# Patient Record
(demographics unavailable — no encounter records)

---

## 2025-01-06 NOTE — COUNSELING
[Fall prevention counseling provided] : Fall prevention counseling provided [Behavioral health counseling provided] : Behavioral health counseling provided [Plan in advance] : Plan in advance [Encouraged to maintain food diary] : Encouraged to maintain food diary [Transportation Issues] : Transportation issues

## 2025-01-08 NOTE — ASSESSMENT
[FreeTextEntry1] : #HCM -will address at next visit in 5 weeks for a comprehensive visit  Marcus Reynaga  [facility]

## 2025-01-08 NOTE — HISTORY OF PRESENT ILLNESS
[Family Member] : family member [FreeTextEntry8] :  50 year-old female with history of asthma, allergic rhinitis, cholecystectomy, and chronic pelvis/abdominal with extensive work-up and s/p ex-lap (11/2019), KVEIN (1/2020), pevlic floor dysfunction, self reported stroke, seizure but not on any AED  and unspecified autoimmune disorders? presents for forms - CDPAP, and to reestablish care.   Pelvic floor dysfunction with associated symptoms of urinary incontinence and suprapubic pain is stable, followed by CRS who did multiple imaging studies with small rectocele,cystocele, peritonicele, and she uses diazepam suppository for pain control.   Skin rash -has been evaluated by the ED at [facility] with a documented dx of hyperhydrosis that required an endo referral but not able to due to lack of insurance coverage? skin lesion was also biopsied by derm who recommended rheum referral due to positive CHANDA? she is also endorsing night sweats, hot flashes that might be indicatint that she is perimenopausal

## 2025-01-08 NOTE — HEALTH RISK ASSESSMENT
[Independent] : using telephone [Some assistance needed] : doing laundry [Full assistance needed] : managing finances [Intercurrent ED visits] : went to ED [No] : No [Never (0 pts)] : Never (0 points) [0] : 2) Feeling down, depressed, or hopeless: Not at all (0) [Never] : Never [No falls in past year] : Patient reported no falls in the past year [de-identified] : dermtology CRS

## 2025-01-08 NOTE — PHYSICAL EXAM
[No Acute Distress] : no acute distress [Well Nourished] : well nourished [Well Developed] : well developed [Well-Appearing] : well-appearing [Normal Voice/Communication] : normal voice/communication [Normal Sclera/Conjunctiva] : normal sclera/conjunctiva [PERRL] : pupils equal round and reactive to light [EOMI] : extraocular movements intact [No Respiratory Distress] : no respiratory distress  [No Accessory Muscle Use] : no accessory muscle use [Clear to Auscultation] : lungs were clear to auscultation bilaterally [Soft] : abdomen soft [Non-distended] : non-distended [No Masses] : no abdominal mass palpated [No HSM] : no HSM [Normal Bowel Sounds] : normal bowel sounds [No Joint Swelling] : no joint swelling [Grossly Normal Strength/Tone] : grossly normal strength/tone [Coordination Grossly Intact] : coordination grossly intact [No Focal Deficits] : no focal deficits [Normal Gait] : normal gait [Speech Grossly Normal] : speech grossly normal [de-identified] : TTP in the suprapubic area [de-identified] : multiple scattered lesions in the trunk macular healing wound also noted.

## 2025-01-08 NOTE — PHYSICAL EXAM
[No Acute Distress] : no acute distress [Well Nourished] : well nourished [Well Developed] : well developed [Well-Appearing] : well-appearing [Normal Voice/Communication] : normal voice/communication [Normal Sclera/Conjunctiva] : normal sclera/conjunctiva [PERRL] : pupils equal round and reactive to light [EOMI] : extraocular movements intact [No Respiratory Distress] : no respiratory distress  [No Accessory Muscle Use] : no accessory muscle use [Clear to Auscultation] : lungs were clear to auscultation bilaterally [Soft] : abdomen soft [Non-distended] : non-distended [No Masses] : no abdominal mass palpated [No HSM] : no HSM [Normal Bowel Sounds] : normal bowel sounds [No Joint Swelling] : no joint swelling [Grossly Normal Strength/Tone] : grossly normal strength/tone [Coordination Grossly Intact] : coordination grossly intact [No Focal Deficits] : no focal deficits [Normal Gait] : normal gait [Speech Grossly Normal] : speech grossly normal [de-identified] : multiple scattered lesions in the trunk macular healing wound also noted.  [de-identified] : TTP in the suprapubic area

## 2025-01-08 NOTE — HEALTH RISK ASSESSMENT
[Independent] : using telephone [Some assistance needed] : doing laundry [Full assistance needed] : managing finances [Intercurrent ED visits] : went to ED [No] : No [Never (0 pts)] : Never (0 points) [0] : 2) Feeling down, depressed, or hopeless: Not at all (0) [Never] : Never [No falls in past year] : Patient reported no falls in the past year [de-identified] : dermtology CRS

## 2025-01-08 NOTE — PHYSICAL EXAM
[No Acute Distress] : no acute distress [Well Nourished] : well nourished [Well Developed] : well developed [Well-Appearing] : well-appearing [Normal Voice/Communication] : normal voice/communication [Normal Sclera/Conjunctiva] : normal sclera/conjunctiva [PERRL] : pupils equal round and reactive to light [EOMI] : extraocular movements intact [No Respiratory Distress] : no respiratory distress  [No Accessory Muscle Use] : no accessory muscle use [Clear to Auscultation] : lungs were clear to auscultation bilaterally [Soft] : abdomen soft [Non-distended] : non-distended [No Masses] : no abdominal mass palpated [No HSM] : no HSM [Normal Bowel Sounds] : normal bowel sounds [No Joint Swelling] : no joint swelling [Grossly Normal Strength/Tone] : grossly normal strength/tone [Coordination Grossly Intact] : coordination grossly intact [No Focal Deficits] : no focal deficits [Normal Gait] : normal gait [Speech Grossly Normal] : speech grossly normal [de-identified] : multiple scattered lesions in the trunk macular healing wound also noted.  [de-identified] : TTP in the suprapubic area

## 2025-01-08 NOTE — HEALTH RISK ASSESSMENT
[Independent] : using telephone [Some assistance needed] : doing laundry [Full assistance needed] : managing finances [Intercurrent ED visits] : went to ED [No] : No [Never (0 pts)] : Never (0 points) [0] : 2) Feeling down, depressed, or hopeless: Not at all (0) [Never] : Never [No falls in past year] : Patient reported no falls in the past year [de-identified] : dermtology CRS

## 2025-01-08 NOTE — REVIEW OF SYSTEMS
[Vision Problems] : vision problems [Abdominal Pain] : abdominal pain [Nausea] : nausea [Diarrhea] : diarrhea [Vomiting] : vomiting [Incontinence] : incontinence [Joint Pain] : joint pain [Joint Stiffness] : joint stiffness [Muscle Weakness] : muscle weakness [Muscle Pain] : muscle pain [Back Pain] : back pain [Itching] : Itching [Skin Rash] : skin rash [Headache] : headache [Confusion] : confusion [Fever] : no fever [Chills] : no chills [Chest Pain] : no chest pain [Shortness Of Breath] : no shortness of breath [Cough] : no cough

## 2025-01-08 NOTE — HISTORY OF PRESENT ILLNESS
[Family Member] : family member [FreeTextEntry8] :  50 year-old female with history of asthma, allergic rhinitis, cholecystectomy, and chronic pelvis/abdominal with extensive work-up and s/p ex-lap (11/2019), KEVIN (1/2020), pevlic floor dysfunction, self reported stroke, seizure but not on any AED  and unspecified autoimmune disorders? presents for forms - CDPAP, and to reestablish care.   Pelvic floor dysfunction with associated symptoms of urinary incontinence and suprapubic pain is stable, followed by CRS who did multiple imaging studies with small rectocele,cystocele, peritonicele, and she uses diazepam suppository for pain control.   Skin rash -has been evaluated by the ED at [facility] with a documented dx of hyperhydrosis that required an endo referral but not able to due to lack of insurance coverage? skin lesion was also biopsied by derm who recommended rheum referral due to positive CHANDA? she is also endorsing night sweats, hot flashes that might be indicatint that she is perimenopausal

## 2025-02-04 NOTE — HISTORY OF PRESENT ILLNESS
[de-identified] : 50 year old woman with history of MAD/central vertigo presents for follow-up.  Last visit 7/6/2021 Most recent episode last week and went to [facility]. Started with neck pain that shot up to her head. Currently taking Meclizine daily at night with noted relief. Describes dizziness as spinning and off balance - episodes comes and go and are NOT consistent. H/o of (+) CHANDA syncope, bradycardia, small aneurysm and mini strokes.  Constant blurry vision (seeing opht), light sensitivity Reports has right sided weakness and fainted 06/21/2021, went to [facility] CT brain and was told had  Reports had MRI brain last week at [facility].  Denies perceived changes in her hearing, otalgia, otorrhea.

## 2025-02-04 NOTE — DATA REVIEWED
[de-identified] : An audiogram was ordered and performed including tympanometry, pure tones and speech, for patient's complaint of vertigo I have independently reviewed the patient's audiogram from today and my findings include aleena Hf SNHL, symmetric, unchanged VNG normal peripheral function [de-identified] : MRI report reviewed, pt with lesions characteristic of migraine, no e/o stroke

## 2025-02-12 NOTE — ASSESSMENT
[FreeTextEntry1] : 54 yo F w/ PMHx of asthma, chronic pelvic/abdominal pain s/p ex-lap (11/2019), KEVIN (1/2020), pelvic floor dysfunction, CVA w/ residual right sided deficits (unknown etiology), migraines and positive CHANDA (no formal diagnosis) who presents for r/o carotid artery disease.   Recommendations: - CTa H/N 1/25/2025 reviewed, no evidence of carotid stenosis or occlusion.  - No further carotid imaging or vascular intervention indicated  - Neurosurgery f/u re. 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

## 2025-02-12 NOTE — HISTORY OF PRESENT ILLNESS
[FreeTextEntry1] : 54 yo F w/ PMHx of asthma, chronic pelvic/abdominal pain s/p ex-lap (11/2019), KEVIN (1/2020), pelvic floor dysfunction, CVA w/ residual right sided deficits (unknown etiology), migraines and positive CHANDA (no formal diagnosis) who presents for r/o carotid artery disease.   Patient recently admitted to [facility] from 1/25/2025 - 1/27/2025 for syncopal episode, w/u remarkable. TTE normal, negative orthostatic, no arrythmia   Patient under prior MRI imaging w/ report today showing 3mm ICA clinoid aneurysm, 3mm pineal cyst and chronic stable lacunar infarct R corona radiata. She underwent recent CTa H/N with no evidence of significant stenosis or occlusion tiny 2 mm aneurysm involving the left clinoid ICA.  Patient states has residual R sided weakness and deficits, stable over the last year.

## 2025-02-12 NOTE — PHYSICAL EXAM
[Respiratory Effort] : normal respiratory effort [2+] : left 2+ [No Rash or Lesion] : No rash or lesion [Alert] : alert [Oriented to Person] : oriented to person [Oriented to Place] : oriented to place [Oriented to Time] : oriented to time [JVD] : no jugular venous distention  [Ankle Swelling (On Exam)] : not present [Varicose Veins Of Lower Extremities] : not present [] : not present [de-identified] : NAD [de-identified] : 4/5 RUE and RLE strength. 5/5 LUE and LLE strength. B/L UE and LE sensation intact

## 2025-02-14 NOTE — HISTORY OF PRESENT ILLNESS
[FreeTextEntry1] : Follow Up  [de-identified] : Jackie East is a 53 year old female with PMH of asthma, chronic pelvic/abdominal pain s/p ex-lap (11/2019), KEVIN (1/2020), pelvic floor dysfunction, CVA w/ residual right sided deficits, migraines and positive CHANDA (no formal diagnosis) who presents to clinic for follow up.  Of note, patient was admitted to [facility] from 1/25/2025 - 1/27/2025 for syncopal episode - workup was largely unremarkable with normal Echo, no arrythmia on Tele, negative orthostatic, no acute abnormality on CT imaging. Patient states she presented to the hospital because of worsening right sided numbness. Patient states she awakens almost every morning with pressure/pain in the neck, sometimes associated with migraine like headache. She reports having MRI imaging done that showed a 3mm R ICA aneurysm, 3mm pineal cyst and chronic stable lacunar infarct R corona radiata.

## 2025-02-14 NOTE — END OF VISIT
[] : Resident [FreeTextEntry3] : 53 year old female with PMH of asthma, chronic pelvic/abdominal pain s/p ex-lap (11/2019), KEVIN (1/2020), pelvic floor dysfunction, CVA w/ residual right sided deficits, migraines and positive CHANDA (no formal diagnosis) who presents to clinic for follow up. Of note, patient was admitted to [facility] from 1/25/2025 - 1/27/2025 for syncopal episode - workup was largely unremarkable with normal Echo, no arrythmia on Tele, negative orthostatic, no acute abnormality on CT imaging. Patient states she presented to the hospital because of worsening right sided numbness. Patient states she awakens almost every morning with pressure/pain in the neck, sometimes associated with migraine like headache. MRI showed a 3mm R ICA aneurysm, 3mm pineal cyst and chronic stable lacunar infarct R corona radiata. Has an outside neurologist that he follows with, encouraged continued follow-up and will give endo referral as well

## 2025-02-14 NOTE — END OF VISIT
[] : Resident [FreeTextEntry3] : 53 year old female with PMH of asthma, chronic pelvic/abdominal pain s/p ex-lap (11/2019), KEVIN (1/2020), pelvic floor dysfunction, CVA w/ residual right sided deficits, migraines and positive CHANDA (no formal diagnosis) who presents to clinic for follow up. Of note, patient was admitted to [facility] from 1/25/2025 - 1/27/2025 for syncopal episode - workup was largely unremarkable with normal Echo, no arrythmia on Tele, negative orthostatic, no acute abnormality on CT imaging. Patient states she presented to the hospital because of worsening right sided numbness. Patient states she awakens almost every morning with pressure/pain in the neck, sometimes associated with migraine like headache. MRI showed a 3mm R ICA aneurysm, 3mm pineal cyst and chronic stable lacunar infarct R corona radiata. Has an outside neurologist that he follows with, encouraged continued follow-up and will give endo referral as well

## 2025-02-14 NOTE — HISTORY OF PRESENT ILLNESS
[FreeTextEntry1] : Follow Up  [de-identified] : Jackie East is a 53 year old female with PMH of asthma, chronic pelvic/abdominal pain s/p ex-lap (11/2019), KEVIN (1/2020), pelvic floor dysfunction, CVA w/ residual right sided deficits, migraines and positive CHANDA (no formal diagnosis) who presents to clinic for follow up.  Of note, patient was admitted to [facility] from 1/25/2025 - 1/27/2025 for syncopal episode - workup was largely unremarkable with normal Echo, no arrythmia on Tele, negative orthostatic, no acute abnormality on CT imaging. Patient states she presented to the hospital because of worsening right sided numbness. Patient states she awakens almost every morning with pressure/pain in the neck, sometimes associated with migraine like headache. She reports having MRI imaging done that showed a 3mm R ICA aneurysm, 3mm pineal cyst and chronic stable lacunar infarct R corona radiata.

## 2025-02-14 NOTE — REVIEW OF SYSTEMS
[Abdominal Pain] : abdominal pain [Frequency] : frequency [Joint Pain] : joint pain [Muscle Pain] : muscle pain [Skin Rash] : skin rash [Fever] : no fever [Chills] : no chills [Night Sweats] : no night sweats [Discharge] : no discharge [Vision Problems] : no vision problems [Earache] : no earache [Nasal Discharge] : no nasal discharge [Chest Pain] : no chest pain [Palpitations] : no palpitations [Lower Ext Edema] : no lower extremity edema [Shortness Of Breath] : no shortness of breath [Wheezing] : no wheezing [Cough] : no cough [Dysuria] : no dysuria [Hematuria] : no hematuria [Itching] : no itching

## 2025-02-14 NOTE — ASSESSMENT
[FreeTextEntry1] : Case discussed with Dr. Gross  RTC in 2 months for follow up  Misael Kidd  Internal Medicine PGY-2 [facility]

## 2025-02-14 NOTE — PHYSICAL EXAM
[No Acute Distress] : no acute distress [Well Nourished] : well nourished [Well Developed] : well developed [Normal Sclera/Conjunctiva] : normal sclera/conjunctiva [PERRL] : pupils equal round and reactive to light [Normal Outer Ear/Nose] : the outer ears and nose were normal in appearance [Supple] : supple [No Respiratory Distress] : no respiratory distress  [Clear to Auscultation] : lungs were clear to auscultation bilaterally [Normal Rate] : normal rate  [Regular Rhythm] : with a regular rhythm [Normal S1, S2] : normal S1 and S2 [No Edema] : there was no peripheral edema [Soft] : abdomen soft [No Joint Swelling] : no joint swelling [de-identified] : Diffusely tender to palpation  [de-identified] : Diffuse macular lesions present on abdominal wall cavity  [de-identified] : 4/5 strength RUE/RLE, 5/5 strength LUE/LLE

## 2025-02-14 NOTE — PHYSICAL EXAM
[No Acute Distress] : no acute distress [Well Nourished] : well nourished [Well Developed] : well developed [Normal Sclera/Conjunctiva] : normal sclera/conjunctiva [PERRL] : pupils equal round and reactive to light [Normal Outer Ear/Nose] : the outer ears and nose were normal in appearance [Supple] : supple [No Respiratory Distress] : no respiratory distress  [Clear to Auscultation] : lungs were clear to auscultation bilaterally [Normal Rate] : normal rate  [Regular Rhythm] : with a regular rhythm [Normal S1, S2] : normal S1 and S2 [No Edema] : there was no peripheral edema [Soft] : abdomen soft [No Joint Swelling] : no joint swelling [de-identified] : Diffusely tender to palpation  [de-identified] : Diffuse macular lesions present on abdominal wall cavity  [de-identified] : 4/5 strength RUE/RLE, 5/5 strength LUE/LLE

## 2025-02-14 NOTE — HISTORY OF PRESENT ILLNESS
[FreeTextEntry1] : Follow Up  [de-identified] : Jackie East is a 53 year old female with PMH of asthma, chronic pelvic/abdominal pain s/p ex-lap (11/2019), KEVIN (1/2020), pelvic floor dysfunction, CVA w/ residual right sided deficits, migraines and positive CHANDA (no formal diagnosis) who presents to clinic for follow up.  Of note, patient was admitted to [facility] from 1/25/2025 - 1/27/2025 for syncopal episode - workup was largely unremarkable with normal Echo, no arrythmia on Tele, negative orthostatic, no acute abnormality on CT imaging. Patient states she presented to the hospital because of worsening right sided numbness. Patient states she awakens almost every morning with pressure/pain in the neck, sometimes associated with migraine like headache. She reports having MRI imaging done that showed a 3mm R ICA aneurysm, 3mm pineal cyst and chronic stable lacunar infarct R corona radiata.

## 2025-02-14 NOTE — END OF VISIT
[] : Resident [FreeTextEntry3] : 53 year old female with PMH of asthma, chronic pelvic/abdominal pain s/p ex-lap (11/2019), KEVIN (1/2020), pelvic floor dysfunction, CVA w/ residual right sided deficits, migraines and positive CHANDA (no formal diagnosis) who presents to clinic for follow up. Of note, patient was admitted to [facility] from 1/25/2025 - 1/27/2025 for syncopal episode - workup was largely unremarkable with normal Echo, no arrythmia on Tele, negative orthostatic, no acute abnormality on CT imaging. Patient states she presented to the hospital because of worsening right sided numbness. Patient states she awakens almost every morning with pressure/pain in the neck, sometimes associated with migraine like headache. MRI showed a 3mm R ICA aneurysm, 3mm pineal cyst and chronic stable lacunar infarct R corona radiata. Has an outside neurologist that he follows with, encouraged continued follow-up and will give endo referral as well

## 2025-02-14 NOTE — PHYSICAL EXAM
[No Acute Distress] : no acute distress [Well Nourished] : well nourished [Well Developed] : well developed [Normal Sclera/Conjunctiva] : normal sclera/conjunctiva [PERRL] : pupils equal round and reactive to light [Normal Outer Ear/Nose] : the outer ears and nose were normal in appearance [Supple] : supple [No Respiratory Distress] : no respiratory distress  [Clear to Auscultation] : lungs were clear to auscultation bilaterally [Normal Rate] : normal rate  [Regular Rhythm] : with a regular rhythm [Normal S1, S2] : normal S1 and S2 [No Edema] : there was no peripheral edema [Soft] : abdomen soft [No Joint Swelling] : no joint swelling [de-identified] : Diffusely tender to palpation  [de-identified] : Diffuse macular lesions present on abdominal wall cavity  [de-identified] : 4/5 strength RUE/RLE, 5/5 strength LUE/LLE

## 2025-03-25 NOTE — HISTORY OF PRESENT ILLNESS
[Anorexia] : anorexia [Malaise] : malaise [Chest Pain] : chest pain [Arthralgias] : arthralgias [Myalgias] : myalgias [FreeTextEntry1] : a 53 year old woman with Psychosomatic disorder, allergic rhinitis, asthma, cva with right residual deficits walks with cane, ?Brain aneurysm, pineal cyst, Pelvic floor dysfunction comes in for referral for positive CHANDA  Patient has h/o chronic pain all over her body since 2019, when she developed pelvic floor dysfunction with rectocele , h/o Abdominal surgery . She complains of pain all over her body including joints, intermittentm no diurinal variation or morning stiffness, redness or swelling in her joints.  She also complains of pustular lesions occurring over abdomen, groin, labia majora, axillary areas, under breasts that are painful with foul smelling discharge since 2 years, got biopsy by derm then, was referred to rheumatologist but hasn't seen a rheumatologist before.  She has h/o Poor sleep wakes up due to body pain, H/o snoring, no sleep study   Denies sicca, raynauds, oral ulcers, photosensitive rash, inflammatory back pain, red eye, IBD symptoms, fever, constitutional symptoms Other h/o : Had syncope in jan, was told to have low Heartrate, unclear diagnosis, Echo normal.   Meds: Magnesium, aspirin, statin       [Weight Loss] : no weight loss [Dry Mouth] : no dry mouth [Dysphonia] : no dysphonia [Dysphagia] : no dysphagia [Shortness of Breath] : no shortness of breath [Joint Warmth] : no joint warmth [Joint Deformity] : no joint deformity [Decreased ROM] : no decreased range of motion [Morning Stiffness] : no morning stiffness [Muscle Weakness] : no muscle weakness

## 2025-03-25 NOTE — PHYSICAL EXAM
[General Appearance - Alert] : alert [General Appearance - Well Nourished] : well nourished [Sclera] : the sclera and conjunctiva were normal [Neck Appearance] : the appearance of the neck was normal [Neck Cervical Mass (___cm)] : no neck mass was observed [Auscultation Breath Sounds / Voice Sounds] : lungs were clear to auscultation bilaterally [Heart Sounds] : normal S1 and S2 [FreeTextEntry1] : right sided weakness

## 2025-03-25 NOTE — ASSESSMENT
[FreeTextEntry1] : a 53 year old woman with Psychosomatic disorder, allergic rhinitis, asthma, cva with right residual deficits walks with cane, ?Brain aneurysm, pineal cyst, Pelvic floor dysfunction comes in for referral for positive CHANDA  #Skin pustular lesions -Since 2 years: more over axilla, groin, labia majora, under breasts ?folliculitis vs hidradenitis suppurativa  -No scarring or subcutaneous swelling or extensive lesions currently on exam supporting HS -Will refer to Dermatology for biopsy and further evaluation  #arthralgia #Myalgia #Pelvic floor dysfunction #Poor sleep -Multiple tenderpoints noted on exam s/o Fibromylagia -No synovitis , ROS negative for AID, behcets, vasculitis, Inflammatory arthritis, psoriasis, IBD -CHANDA 1:40 , DNA, duckworth, RNP, Sjogrens negative -Will repeat as patient and family insists on repeating her autoimmune work up although suspiscion for autoimmune disease is extremely low - Discussed in length about benefits of duloxetine in fibromyalgia but patient refused as she feels she develops mood swings with drugs. she had mood swings with doxycycline, although we explained that it is not the same drug , patient doesnt want treatment but wants a  diagnosis for her underlying symptoms   RTC in 4 weeks  d/w Dr.Marder Jose Juarez PGY4-Rheumatology Fellow

## 2025-04-21 NOTE — HISTORY OF PRESENT ILLNESS
[FreeTextEntry1] : Follow Up  [de-identified] : Jackie East is a 53 year old female with PMH of asthma, chronic pelvic/abdominal pain s/p ex-lap (11/2019), KEVIN (1/2020), pelvic floor dysfunction, CVA w/ residual right sided deficits, migraines and positive CHANDA (no formal diagnosis) who presents to clinic for follow up. Since last visit, patient evaluated by vascular surgery, no further carotid imaging or intervention required. She is scheduled to see NSGY regarding the 2mm clinoid aneurysm and pineal cyst. Also seen by rheumatology for positive CHANDA, given clinical presentation and lab work low concern for rheumatologic process. Patient requests assistance with obtaining bed covers for incontinence, as well as obtaining disability, which we will assist with help of social work.

## 2025-04-21 NOTE — REVIEW OF SYSTEMS
[Abdominal Pain] : abdominal pain [Joint Pain] : joint pain [Muscle Pain] : muscle pain [Skin Rash] : skin rash [Fever] : no fever [Chills] : no chills [Night Sweats] : no night sweats [Discharge] : no discharge [Vision Problems] : no vision problems [Earache] : no earache [Nasal Discharge] : no nasal discharge [Chest Pain] : no chest pain [Palpitations] : no palpitations [Lower Ext Edema] : no lower extremity edema [Shortness Of Breath] : no shortness of breath [Wheezing] : no wheezing [Cough] : no cough [Dysuria] : no dysuria [Hematuria] : no hematuria [Itching] : no itching

## 2025-04-21 NOTE — PHYSICAL EXAM
[No Acute Distress] : no acute distress [Well Nourished] : well nourished [Well Developed] : well developed [Normal Sclera/Conjunctiva] : normal sclera/conjunctiva [PERRL] : pupils equal round and reactive to light [Normal Outer Ear/Nose] : the outer ears and nose were normal in appearance [Supple] : supple [No Respiratory Distress] : no respiratory distress  [Clear to Auscultation] : lungs were clear to auscultation bilaterally [Normal Rate] : normal rate  [Regular Rhythm] : with a regular rhythm [Normal S1, S2] : normal S1 and S2 [No Edema] : there was no peripheral edema [Soft] : abdomen soft [No Joint Swelling] : no joint swelling [de-identified] : Diffusely tender to palpation  [de-identified] : 4/5 strength RUE/RLE, 5/5 strength LUE/LLE

## 2025-05-05 NOTE — PHYSICAL EXAM
[FreeTextEntry1] : AO3, PERRL, EOMI, VFF, decreased sensation of R face (hx of bells palsy), MORATAYA 5/5, SILT

## 2025-05-05 NOTE — ASSESSMENT
[FreeTextEntry1] : 53 year old female with PMH of asthma, chronic pelvic/abdominal pain s/p ex-lap (11/2019), KEVIN (1/2020), pelvic floor dysfunction, CVA w/ residual right sided deficits, migraines and positive CHANDA (no formal diagnosis) who presents to clinic for follow up after finding of tiny 2 mm aneurysm involving the left clinoid ICA and reportedly 2mm pineal cyst. Patient reports a series of symptoms since at least 2020 including syncope, persistent HAs, and vertigo. She reports seeing a neurologist, ENT, rheumatologist, and cardiologist but w/o significant findings yet to explain her symptoms. She reports no red flag symptoms since finding of small aneurysm.  -MRI brain w/wo and MRA -patient educated on red flag symptoms pertaining to aneurysm rupture

## 2025-05-05 NOTE — REASON FOR VISIT
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 Patient reports a series of symptoms since at least 2020 including syncope, persistent HAs, and vertigo. She reports seeing a neurologist, ENT, rheumatologist, and cardiologist but w/o significant findings yet to explain her symptoms. She reports no red flag symptoms since finding of small aneurysm.

## 2025-06-19 NOTE — RESULTS/DATA
[FreeTextEntry1] : ACC: [id number] EXAM: MR VENOGRAM BRAIN ORDERED BY: TD TREVIZO  ACC: [id number] EXAM: MR ANGIO BRAIN ORDERED BY: TD TREVIZO  ACC: [id number] EXAM: MR BRAIN WAW IC FOR SRS ORDERED BY: TD TREVIZO  PROCEDURE DATE: 05/19/2025    INTERPRETATION: Three examinations were performed: 1. MR angiography intracranial circulation without gadolinium contrast 2. MR brain with and without gadolinium contrast 3. MR venogram brain without gadolinium contrast   CLINICAL INFORMATION: hx pineal gland cyst and ICA aneurysm c/o headache LMR  TECHNIQUE: 1. Brain: Sagittal and axial T1-weighted images, axial FLAIR images, axial susceptibility weighted images, axial T2-weighted images and axial diffusion weighted images of the brain were obtained. Following gadolinium administration volumetric axial T1-weighted inversion recovery fast gradient recalled echo images were obtained. This data set was reconstructed in the arteries on the sagittal and coronal planes. 2. Intracranial circulation: MR angiography was performed using three-dimensional time-of-flight technique. This data set was reconstructed as maximum intensity pixel images and displayed in multiple rotations. 3. MR venogram brain: Three-dimensional time of flight MR venogram was performed in the coronal plane. Three-dimensional time-of-flight MR angiography was performed in the sagittal plane. Post processing angiographic reconstruction of images was performed. Each data set was reconstructed as maximum intensity pixel images and displayed in multiple rotations. CONTRAST: Gadavist: 4.1 cc administered ; 3.4 cc discarded  COMPARISON: CT angiography preceding date also 1/25/2025   FINDINGS:  INTRACRANIAL ARTERIAL CIRCULATION  The ANTERIOR circulation demonstrates intact inflow from the ascending cervical segment to the petrous segment of each internal carotid artery. The cavernous and clinoid segments demonstrate mild luminal irregularity suggesting atherosclerotic plaque, greatest in the proximal clinoid segments. On the left small saccular aneurysm appears to project posterior superior and medial to the distal cavernous segment (image 73). This measures approximately 0.3 cm perpendicular to the vessel lumen by 0.2 cm parallel. The ophthalmic arteries are demonstrated as patent vessels on each side. The anterior cerebral arterial A1 segments are patent and near symmetric in caliber. An anterior communicating artery is present. The anterior cerebral arterial A2 segments are patent to peripheral branching. The right middle cerebral artery demonstrates an intact initial M1 segment and patent peripheral anterior and posterior division sylvian branches. The left middle cerebral artery demonstrates an intact initial M1 segment and patent peripheral anterior and posterior division sylvian branches.  The POSTERIOR circulation demonstrates intact inflow from each vertebral artery. The left vertebral artery is dominant caliber. PICA artery is demonstrated on the right, not definitely identified on the left. An inferior cerebellar artery is demonstrated. The basilar artery appears intact. Superior cerebellar arteries are demonstrated. Posterior communicating arteries small caliber are demonstrated on each side. Each posterior cerebral artery is patent to peripheral branching.  No intracranial aneurysm or arteriovenous malformation is recognized. Note that small intracranial aneurysms less than 0.5 cm may not be detected by this technique.  BRAIN  BRAIN PARENCHYMA: The brain demonstrates several small focal indistinct lesions within the cerebral hemispheric white matter that suggest ischemic white matter disease. These lesions are hyperintense on the long TR images, otherwise inconspicuous. Lesions are scattered in the subcortical and deeper white matter of the cerebral hemispheres. No cerebral cortical lesion is identified. No diffusion restriction is found in the brain. No acute cerebral cortical infarct is found. No intracranial hemorrhage is recognized. No mass effect is found in the brain. Following gadolinium administration no abnormal enhancement is found in the brain.  CSF SPACES: The ventricles, sulci and basal cisterns appear unremarkable. In the right mid corona radiata subependymal region there is a tiny cystic structure that appears to be atypical Virchow Gordy space.  HEAD AND NECK STRUCTURES: The orbits are unremarkable. Paranasal sinuses are clear. The nasal septum is shallow irregular deviation greatest left to right in its anterior aspect. The central skull base appears intact. The nasopharynx is symmetric. The temporal bones appear clear of disease. The calvarium appears unremarkable.  INTRACRANIAL DURAL SINUSES  The superior sagittal sinus is patent in both its anterior and posterior limbs. The right transverse and sigmoid sinuses are patent to the right internal jugular vein. The left transverse and sigmoid sinus are patent to the left internal jugular vein. Asymmetry of the transverse sinus caliber is within the limits of developmental variation.  The internal cerebral veins, basal veins of Jean, vein of Slick and straight sinus appear patent.  The cavernous sinuses demonstrate no anomalous flow and appears symmetric. The superior ophthalmic veins are not dilated.  ADDITIONAL FINDINGS: None   IMPRESSION:  1. BRAIN: No evidence of acute infarction. No abnormal enhancement. Several scattered cerebral hemispheric white matter lesions are indeterminate, early manifestation of ischemic white matter disease versus inflammatory or etiology, mild in extent  2. ANTERIOR INTRACRANIAL CIRCULATION: Intracranial atherosclerosis clinoid segments of the internal carotid arteries, mild. Left internal carotid distal cavernous segment small saccular aneurysm.  3. POSTERIOR INTRACRANIAL CIRCULATION: Intact.  4. INTRACRANIAL DURAL SINUSES: No evidence dural sinus thrombosis.  --- End of Report ---      CATALINA TAMEZ MD; Attending Radiologist This document has been electronically signed. May 19 2025 7:48AM

## 2025-06-19 NOTE — REVIEW OF SYSTEMS
[As Noted in HPI] : as noted in HPI [Confused or Disoriented] : confusion [Memory Lapses or Loss] : memory loss [Decr. Concentrating Ability] : decreased concentrating ability [Negative] : Heme/Lymph [de-identified] : headaches

## 2025-06-19 NOTE — REASON FOR VISIT
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 Patient reports a series of symptoms since at least 2020 including syncope, persistent HAs, and vertigo. She reports seeing a neurologist, ENT, rheumatologist, and cardiologist but w/o significant findings yet to explain her symptoms. Known about cerebral aneurysm since 2021. Today she has chief complaint of headaches. Takes advil for the headaches and that helps a bit.  No family history of cerebral aneurysm and no social history of smoking.  Dr. Antoine Crespo [facility] Neurologist in [place], [place] Book online Address: [address], [place] [address] Phone: [phone]

## 2025-06-19 NOTE — ASSESSMENT
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 Patient reports a series of symptoms since at least 2020 including syncope, persistent HAs, and vertigo. She reports seeing a neurologist, ENT, rheumatologist, and cardiologist but w/o significant findings yet to explain her symptoms.   MRA Brain demonstrates-  Intracranial atherosclerosis clinoid segments of the internal carotid arteries, mild. Left internal carotid distal cavernous segment small saccular aneurysm 2mm ( patient states she has known about this aneurysm since 2021 imaging was done at [facility] at that time )  Discussed very low risks of aneurysm rupture based on size and location this aneurysm is not intradural can not cause subarachnoid hemorrhage. Discussed her symptoms of headaches/dizziness/syncope is not caused by this very small cavernous aneurysm  Plan: Based on the size and location of the aneurysm do not recommend treatment recommend conservative management with repeat mra brain non con in 1 year  then ttm after to review Continue to follow up with neurology for her headaches and vertigo  Advised to bring us the prior imaging from [facility] for review and evaluate for stability of the aneurysm size - reviewed the mra brain report from 6/2022 which read as 3mm left cavernous ica aneurysm

## 2025-07-29 NOTE — HISTORY OF PRESENT ILLNESS
[FreeTextEntry1] : a 53-year-old woman with, allergic rhinitis, asthma, cva with right residual deficits walks with cane, left internal carotid distal cavernous segment small saccular aneurysm, pineal cyst, Pelvic floor dysfunction comes presented initially for positive CHANDA, clinical exam and labs c/w fibromyalgia here for follow-up  -Seen by Jose on 3/24/2025, She complains of pain all over her body including joints, intermittent no diurnal variation or morning stiffness, redness or swelling in her joints. associated with poor sleep. Had multiple tender points on PE s/o fibromyalgia. No synovitis, ROS negative for AID, behcets, vasculitis, Inflammatory arthritis, psoriasis, IBD. Initial CHANDA 1:40, DNA, duckworth, RNP, Sjogren's negative. Duloxetine discussed in length with patient, but patient refused as she feels she develops mood swings with drugs. she had mood swings with doxycycline, although we explained that it is not the same drug, patient doesn't want treatment but wants a diagnosis for her underlying symptoms  -She also complained of pustular lesions occurring over abdomen, groin, labia majora, axillary areas, under breasts that are painful with foul smelling discharge for 2 years, got biopsy by derm then, was referred to rheumatologist but hasn't seen a rheumatologist before. referred to derm for biopsy. repeat labs on 3/2025, BUn/creat normal at 12/0.72, AST/ALT normal at 18/19, FR negative <10, CCP<8, UPCR 0.1, CBC with Hb 12.3 normal MCHC 30.4, CRP <3, C3 146, C4 18, anti ds-DNA<1, RNP<0.2, smith<0.2, SSA and SSB negative <0.2, CHANDA 1:320 speckled. TSH reviewed from 1/2025 1.36 wnl.   -Patient went to the ED 5/19/2025 presenting with headaches, interval increase in size of aneurysm on new CTA. evaluated from neurosurgery. Recommended MRI/MRA 1. BRAIN: No evidence of acute infarction. No abnormal enhancement. Several scattered cerebral hemispheric white matter lesions are indeterminate, early manifestation of ischemic white matter disease versus inflammatory or etiology, mild in extent ANTERIOR INTRACRANIAL CIRCULATION: Intracranial atherosclerosis clinoid segments of the internal carotid arteries, mild. Left internal carotid distal cavernous segment small saccular aneurysm. WAs found to have ESBL UTI, was prescribed Abx outpatient, completed the course.   -seen dermatology at [facility] on 8/2024: She was seen in the ED on 7/7/24 for evaluation of rash with lesions. present under her right breast and her groin/vulva x 3 days. She was prescribed with Augmentin, tolerating it well. She noted that the lesion on her right breast spontaneously ruptured and drained after the ER visit. She had similar symptoms in the past with the lesions spontaneously drained and dried out and was diagnosed with hidradenitis suppurativa. She denied any retraction, dimpling or nipple discharge.  Today's visit 7/28/2025, patient reports pain all over her body, reports in all joints but also points towards hands, wrists, upper arms, elbow, shoulders, hips, knees, feet, back, pelvis with interference in sleep and whole-body aching with soreness, burnings, pins and needle sensation, shooting pain, no specific locations, pain is 8/10 in intensity has been going on for about a year worsened couple of weeks/months. Also feels stiffness in morning associated with pain has hard time waking up lasts about 20-30 min. Grey reports intermittent swelling. Doesn't improve with exercises, taking ibuprofen or tylenol provides temporary relief. Does massages and hot baths with temporary relief.   Skin lesions appeared when she had her first stroke in 2021, start small like bumps and pimple and swell up then they ooze. Heal and leave scars. same intensity. Saw dermatologist, was given cream?  also had a biopsy, this was at [facility]. Described it as HS as was appearing under breast, under axilla and under groin would get inflamed and spontaneously rupture and drain.  Currently denies Raynaud, weight loss, fever and chills, hair loss, photosensitivity, new skin rashes or worsening skin rashes, has dry eyes but no dry mouth.   Is UpToDate with cancer screening, no hx of personal malignancy  Denies hx of mental illness or major stressor.  Her pain significantly impacts her activities of daily living, hasn't been able to work the last couple of years. Has 4 children oldest has a daughter that came with patients. Unable to get restful sleep. Unable to walk more than one block. Hasn't tried physical therapy.

## 2025-07-29 NOTE — ASSESSMENT
[FreeTextEntry1] : a 53-year-old woman with allergic rhinitis, asthma, cva with right residual deficits walks with cane, ?Brain aneurysm, pineal cyst, Pelvic floor dysfunction comes in for referral for positive CHANDA  #Fibromyalgia - wide spread arthralgias - wide spread Myalgias - elvic floor dysfunction - Poor sleep - Multiple tender points noted on exam s/o Fibromylagia - No synovitis , ROS negative for AID  #Skin pustular lesions -Since 2 years: more over axilla, groin, labia majora, under breasts ?folliculitis vs hidradenitis suppurativa -per [facility] visit 8/2024: She was seen in the ED on 7/7/24 for evaluation of rash with lesions. present under her right breast and her groin/vulva x 3 days. She was prescribed with Augmentin, tolerating it well. She noted that the lesion on her right breast spontaneously ruptured and drained after the ER visit. She had similar symptoms in the past with the lesions spontaneously drained and dried out and was diagnosed with hidradenitis suppurativa. She denied any retraction, dimpling or nipple discharge. -No scarring or subcutaneous swelling or extensive lesions currently on exam supporting HS -Will refer to Dermatology for biopsy and further evaluation -not seen derm yet  # No evidence of systemic autoimmune /inflammatory condition  - Serological work-up - negatice  -CHANDA 1:40 , DNA, duckworth, RNP, Sjogrens negative - repeat labs on 3/2025, BUn/creat normal at 12/0.72, AST/ALT normal at 18/19, FR negative <10, CCP<8, UPCR 0.1, CBC with Hb 12.3 normal MCHC 30.4, CRP <3, C3 146, C4 18, anti ds-DNA<1, RNP<0.2, smith<0.2, SSA and - SSB negative <0.2, CHANDA 1:320 speckled. TSH revoiewed from 1/2025 1.36 wnl.   - Labs  - discussed in length about benefits of duloxetine in fibromyalgia last visit but patient refused as she feels she develops mood swings with drugs. she had mood swings with doxycycline, although we explained that it is not the same drug, patient doesn't want treatment but wants a diagnosis for her underlying symptoms Agreed this visit on a trial of gabapentin will start by 100 mg nightly for a week the 200 mg nightly for a week then 300 nightly for a week until next appointment - physical therapy - Good sleep hygiene - advised psychotherapy but adamantly refused  RTC in 1 month  Evelyn Gutiérrez MD PGY-4 rheumatology fellow  d/w Dr Calloway

## 2025-07-29 NOTE — PHYSICAL EXAM
[General Appearance - Alert] : alert [General Appearance - In No Acute Distress] : in no acute distress [Sclera] : the sclera and conjunctiva were normal [PERRL With Normal Accommodation] : pupils were equal in size, round, and reactive to light [Extraocular Movements] : extraocular movements were intact [Outer Ear] : the ears and nose were normal in appearance [Oropharynx] : the oropharynx was normal [Neck Appearance] : the appearance of the neck was normal [Neck Cervical Mass (___cm)] : no neck mass was observed [Jugular Venous Distention Increased] : there was no jugular-venous distention [Thyroid Diffuse Enlargement] : the thyroid was not enlarged [Thyroid Nodule] : there were no palpable thyroid nodules [] : no respiratory distress [Auscultation Breath Sounds / Voice Sounds] : lungs were clear to auscultation bilaterally [Heart Rate And Rhythm] : heart rate was normal and rhythm regular [Heart Sounds] : normal S1 and S2 [Heart Sounds Gallop] : no gallops [Murmurs] : no murmurs [Heart Sounds Pericardial Friction Rub] : no pericardial rub [Edema] : there was no peripheral edema [Bowel Sounds] : normal bowel sounds [Abdomen Soft] : soft [Oriented To Time, Place, And Person] : oriented to person, place, and time [Impaired Insight] : insight and judgment were intact [FreeTextEntry1] : at baseline

## 2025-07-29 NOTE — HISTORY OF PRESENT ILLNESS
[FreeTextEntry1] : a 53-year-old woman with, allergic rhinitis, asthma, cva with right residual deficits walks with cane, left internal carotid distal cavernous segment small saccular aneurysm, pineal cyst, Pelvic floor dysfunction comes presented initially for positive CHANDA, clinical exam and labs c/w fibromyalgia here for follow-up  -Seen by Jose on 3/24/2025, She complains of pain all over her body including joints, intermittent no diurnal variation or morning stiffness, redness or swelling in her joints. associated with poor sleep. Had multiple tender points on PE s/o fibromyalgia. No synovitis, ROS negative for AID, behcets, vasculitis, Inflammatory arthritis, psoriasis, IBD. Initial CHANDA 1:40, DNA, duckworth, RNP, Sjogren's negative. Duloxetine discussed in length with patient, but patient refused as she feels she develops mood swings with drugs. she had mood swings with doxycycline, although we explained that it is not the same drug, patient doesn't want treatment but wants a diagnosis for her underlying symptoms  -She also complained of pustular lesions occurring over abdomen, groin, labia majora, axillary areas, under breasts that are painful with foul smelling discharge for 2 years, got biopsy by derm then, was referred to rheumatologist but hasn't seen a rheumatologist before. referred to derm for biopsy. repeat labs on 3/2025, BUn/creat normal at 12/0.72, AST/ALT normal at 18/19, FR negative <10, CCP<8, UPCR 0.1, CBC with Hb 12.3 normal MCHC 30.4, CRP <3, C3 146, C4 18, anti ds-DNA<1, RNP<0.2, smith<0.2, SSA and SSB negative <0.2, CHANDA 1:320 speckled. TSH reviewed from 1/2025 1.36 wnl.   -Patient went to the ED 5/19/2025 presenting with headaches, interval increase in size of aneurysm on new CTA. evaluated from neurosurgery. Recommended MRI/MRA 1. BRAIN: No evidence of acute infarction. No abnormal enhancement. Several scattered cerebral hemispheric white matter lesions are indeterminate, early manifestation of ischemic white matter disease versus inflammatory or etiology, mild in extent ANTERIOR INTRACRANIAL CIRCULATION: Intracranial atherosclerosis clinoid segments of the internal carotid arteries, mild. Left internal carotid distal cavernous segment small saccular aneurysm. WAs found to have ESBL UTI, was prescribed Abx outpatient, completed the course.   -seen dermatology at [facility] on 8/2024: She was seen in the ED on 7/7/24 for evaluation of rash with lesions. present under her right breast and her groin/vulva x 3 days. She was prescribed with Augmentin, tolerating it well. She noted that the lesion on her right breast spontaneously ruptured and drained after the ER visit. She had similar symptoms in the past with the lesions spontaneously drained and dried out and was diagnosed with hidradenitis suppurativa. She denied any retraction, dimpling or nipple discharge.  Today's visit 7/28/2025, patient reports pain all over her body, reports in all joints but also points towards hands, wrists, upper arms, elbow, shoulders, hips, knees, feet, back, pelvis with interference in sleep and whole-body aching with soreness, burnings, pins and needle sensation, shooting pain, no specific locations, pain is 8/10 in intensity has been going on for about a year worsened couple of weeks/months. Also feels stiffness in morning associated with pain has hard time waking up lasts about 20-30 min. Grey reports intermittent swelling. Doesn't improve with exercises, taking ibuprofen or tylenol provides temporary relief. Does massages and hot baths with temporary relief.   Skin lesions appeared when she had her first stroke in 2021, start small like bumps and pimple and swell up then they ooze. Heal and leave scars. same intensity. Saw dermatologist, was given cream?  also had a biopsy, this was at [facility]. Described it as HS as was appearing under breast, under axilla and under groin would get inflamed and spontaneously rupture and drain.  Currently denies Raynaud, weight loss, fever and chills, hair loss, photosensitivity, new skin rashes or worsening skin rashes, has dry eyes but no dry mouth.   Is UpToDate with cancer screening, no hx of personal malignancy  Denies hx of mental illness or major stressor.  Her pain significantly impacts her activities of daily living, hasn't been able to work the last couple of years. Has 4 children oldest has a daughter that came with patients. Unable to get restful sleep. Unable to walk more than one block. Hasn't tried physical therapy.